# Patient Record
Sex: MALE | URBAN - METROPOLITAN AREA
[De-identification: names, ages, dates, MRNs, and addresses within clinical notes are randomized per-mention and may not be internally consistent; named-entity substitution may affect disease eponyms.]

---

## 2018-09-11 ENCOUNTER — TELEPHONE (OUTPATIENT)
Dept: ONCOLOGY | Age: 48
End: 2018-09-11

## 2018-09-11 NOTE — TELEPHONE ENCOUNTER
Received phone call from Mel Forman at [facility] stating that Dr. Chuck Davis had ordered BRCA1/2 testing for patient. [facility] requires genetic counseling be completed before prior authorization can be approved. She faxed a referral from Dr. Ree Opitz for genetic counseling. Confirmed receipt of referral and confirmed that I will contact pt to schedule. Contacted patient and informed him of referral for genetic counseling. He states that he was unaware of the referral. Explained the requirement by [facility] before testing can be authorized and performed. He has multiple questions regarding what the testing is for. Explained purpose of testing to patient including risk management for himself, kids and other family members. He relates that he is extremely busy right now with appointments, including getting his port placed. He requests that the appointment be deferred until October or November. Explained that I can see him in [place] or [place], depending on his location preference at any time. I will recontact him in October to determine his final decision to proceed. I will update Dr. Ree Opitz of his request to hold off.